# Patient Record
Sex: MALE | Race: WHITE | NOT HISPANIC OR LATINO | Employment: PART TIME | URBAN - METROPOLITAN AREA
[De-identification: names, ages, dates, MRNs, and addresses within clinical notes are randomized per-mention and may not be internally consistent; named-entity substitution may affect disease eponyms.]

---

## 2024-01-04 ENCOUNTER — HOSPITAL ENCOUNTER (EMERGENCY)
Facility: HOSPITAL | Age: 24
Discharge: HOME | End: 2024-01-04
Attending: EMERGENCY MEDICINE
Payer: MEDICAID

## 2024-01-04 VITALS
SYSTOLIC BLOOD PRESSURE: 129 MMHG | RESPIRATION RATE: 18 BRPM | HEIGHT: 74 IN | TEMPERATURE: 97.9 F | BODY MASS INDEX: 20.92 KG/M2 | HEART RATE: 76 BPM | DIASTOLIC BLOOD PRESSURE: 84 MMHG | OXYGEN SATURATION: 97 % | WEIGHT: 163 LBS

## 2024-01-04 DIAGNOSIS — G89.29 CHRONIC DENTAL PAIN: ICD-10-CM

## 2024-01-04 DIAGNOSIS — K02.9 DENTAL CARIES: Primary | ICD-10-CM

## 2024-01-04 DIAGNOSIS — K08.9 CHRONIC DENTAL PAIN: ICD-10-CM

## 2024-01-04 PROCEDURE — 25010000002 KETOROLAC TROMETHAMINE PER 15 MG: Performed by: EMERGENCY MEDICINE

## 2024-01-04 PROCEDURE — 99284 EMERGENCY DEPT VISIT MOD MDM: CPT | Performed by: EMERGENCY MEDICINE

## 2024-01-04 PROCEDURE — 96372 THER/PROPH/DIAG INJ SC/IM: CPT

## 2024-01-04 PROCEDURE — 99282 EMERGENCY DEPT VISIT SF MDM: CPT

## 2024-01-04 RX ORDER — IBUPROFEN 600 MG/1
600 TABLET ORAL EVERY 8 HOURS PRN
Qty: 15 TABLET | Refills: 0 | Status: SHIPPED | OUTPATIENT
Start: 2024-01-04

## 2024-01-04 RX ORDER — PENICILLIN V POTASSIUM 500 MG/1
500 TABLET ORAL 4 TIMES DAILY
Qty: 40 TABLET | Refills: 0 | Status: SHIPPED | OUTPATIENT
Start: 2024-01-04 | End: 2024-01-14

## 2024-01-04 RX ORDER — CHLORHEXIDINE GLUCONATE ORAL RINSE 1.2 MG/ML
15 SOLUTION DENTAL 2 TIMES DAILY
Qty: 473 ML | Refills: 0 | Status: SHIPPED | OUTPATIENT
Start: 2024-01-04

## 2024-01-04 RX ORDER — KETOROLAC TROMETHAMINE 15 MG/ML
15 INJECTION, SOLUTION INTRAMUSCULAR; INTRAVENOUS ONCE
Status: COMPLETED | OUTPATIENT
Start: 2024-01-04 | End: 2024-01-04

## 2024-01-04 RX ADMIN — KETOROLAC TROMETHAMINE 15 MG: 15 INJECTION, SOLUTION INTRAMUSCULAR; INTRAVENOUS at 02:47
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
## 2025-06-14 ENCOUNTER — HOSPITAL ENCOUNTER (OUTPATIENT)
Facility: HOSPITAL | Age: 25
Discharge: HOME | End: 2025-06-14
Attending: EMERGENCY MEDICINE | Admitting: EMERGENCY MEDICINE
Payer: MEDICAID

## 2025-06-14 VITALS
SYSTOLIC BLOOD PRESSURE: 131 MMHG | RESPIRATION RATE: 18 BRPM | HEIGHT: 74 IN | BODY MASS INDEX: 20.07 KG/M2 | WEIGHT: 156.4 LBS | DIASTOLIC BLOOD PRESSURE: 94 MMHG | HEART RATE: 75 BPM | TEMPERATURE: 98 F | OXYGEN SATURATION: 98 %

## 2025-06-14 DIAGNOSIS — R59.0 POSTAURICULAR ADENOPATHY: ICD-10-CM

## 2025-06-14 DIAGNOSIS — K12.2 CELLULITIS OF BUCCAL SPACE OF MOUTH: Primary | ICD-10-CM

## 2025-06-14 PROCEDURE — 99213 OFFICE O/P EST LOW 20 MIN: CPT | Performed by: EMERGENCY MEDICINE

## 2025-06-14 PROCEDURE — G0463 HOSPITAL OUTPT CLINIC VISIT: HCPCS | Performed by: EMERGENCY MEDICINE

## 2025-06-14 RX ORDER — CEFUROXIME AXETIL 500 MG/1
500 TABLET ORAL 2 TIMES DAILY
Qty: 10 TABLET | Refills: 0 | Status: SHIPPED | OUTPATIENT
Start: 2025-06-15 | End: 2025-06-25

## 2025-06-14 RX ORDER — CEPHALEXIN 500 MG/1
500 CAPSULE ORAL ONCE
Status: COMPLETED | OUTPATIENT
Start: 2025-06-14 | End: 2025-06-14

## 2025-06-14 RX ADMIN — CEPHALEXIN 500 MG: 500 CAPSULE ORAL at 21:30

## 2025-06-15 NOTE — FSED PROVIDER NOTE
Subjective   History of Present Illness  Patient presents emergency room complaints of right mid facial swelling since yesterday.  He also reports swelling behind his left ear for 2 months.  He denies pain on either his right face or behind his left ear.  Patient denies dental pain, oral pain, earache, sore throat, neck pain, fever or chills.      Review of Systems   Constitutional:  Negative for chills and fever.   HENT:  Positive for facial swelling. Negative for congestion, ear discharge, ear pain, rhinorrhea, sore throat and trouble swallowing.    Eyes: Negative.    Respiratory:  Negative for cough and shortness of breath.    Cardiovascular:  Negative for chest pain and palpitations.   Musculoskeletal:  Negative for neck pain.   Skin:  Negative for rash.   Neurological:  Negative for light-headedness and headaches.   All other systems reviewed and are negative.      History reviewed. No pertinent past medical history.    No Known Allergies    History reviewed. No pertinent surgical history.    History reviewed. No pertinent family history.    Social History     Socioeconomic History    Marital status: Single   Substance and Sexual Activity    Drug use: Yes     Types: Marijuana           Objective   Physical Exam  Vitals reviewed.   HENT:      Head: Normocephalic.      Comments: Right maxillary facial soft tissue area with mild soft tissue swelling, without palpable tenderness, erythema, palpable tenderness or increased warmth.     Right Ear: Tympanic membrane, ear canal and external ear normal.      Left Ear: Tympanic membrane, ear canal and external ear normal.      Ears:      Comments: Left retroauricular area, 1 cm round soft tissue swelling, without erythema, palpable tenderness or increased warmth.  No dental or perigingival tenderness or swelling.     Nose: Nose normal.      Mouth/Throat:      Mouth: Mucous membranes are moist.      Pharynx: Oropharynx is clear.   Eyes:      Extraocular Movements:  Extraocular movements intact.      Conjunctiva/sclera: Conjunctivae normal.      Pupils: Pupils are equal, round, and reactive to light.   Cardiovascular:      Rate and Rhythm: Normal rate and regular rhythm.   Pulmonary:      Effort: Pulmonary effort is normal. No respiratory distress.      Breath sounds: Normal breath sounds.   Musculoskeletal:      Cervical back: Normal range of motion and neck supple. No tenderness.   Lymphadenopathy:      Cervical: No cervical adenopathy.   Skin:     General: Skin is warm and dry.      Capillary Refill: Capillary refill takes less than 2 seconds.      Findings: No rash.   Neurological:      General: No focal deficit present.      Mental Status: He is alert and oriented to person, place, and time.      Motor: No weakness.         Procedures           ED Course                                           Medical Decision Making  PROGRESS NOTES:    2110 hrs; patient arrives facial cellulitis, will give cephalexin 500 mg by mouth.  He also has left posterior auricular lymphadenopathy.    2142 hrs: Patient stated he feels better, tolerated ED management, I will discharge him home, for outpatient follow-up with primary doctor.      Problems Addressed:  Cellulitis of buccal space of mouth: complicated acute illness or injury  Postauricular adenopathy: complicated acute illness or injury    Risk  Prescription drug management.        Final diagnoses:   Cellulitis of buccal space of mouth   Postauricular adenopathy       ED Disposition  ED Disposition       ED Disposition   Discharge    Condition   Stable    Comment   --               [address] [address]  [phone]  In 3 days  TRANSFER CENTER: Call for Referral for Ear, Nose and Throat Specialist         Medication List        New Prescriptions      cefuroxime 500 MG tablet  Commonly known as: CEFTIN  Take 1 tablet by mouth 2 (Two) Times a Day for 10 days.             Stop      ibuprofen 600 MG tablet  Commonly known as: ROSY BAY               Where to Get Your Medications        These medications were sent to [facility]/pharmacy #[id number] - [place], [place] - [address] [place] [address] - [phone]  - [phone] [address] [place] [address]      Hours: 24-hours Phone: [phone]   cefuroxime 500 MG tablet

## 2025-06-25 ENCOUNTER — HOSPITAL ENCOUNTER (OUTPATIENT)
Facility: HOSPITAL | Age: 25
Discharge: HOME | End: 2025-06-25
Attending: EMERGENCY MEDICINE | Admitting: EMERGENCY MEDICINE
Payer: MEDICAID

## 2025-06-25 VITALS
HEIGHT: 75 IN | RESPIRATION RATE: 18 BRPM | TEMPERATURE: 98.4 F | DIASTOLIC BLOOD PRESSURE: 90 MMHG | HEART RATE: 85 BPM | SYSTOLIC BLOOD PRESSURE: 134 MMHG | BODY MASS INDEX: 19.17 KG/M2 | WEIGHT: 154.2 LBS | OXYGEN SATURATION: 98 %

## 2025-06-25 DIAGNOSIS — K02.9 DENTAL CARIES: Primary | ICD-10-CM

## 2025-06-25 DIAGNOSIS — G89.29 CHRONIC DENTAL PAIN: ICD-10-CM

## 2025-06-25 DIAGNOSIS — K08.9 CHRONIC DENTAL PAIN: ICD-10-CM

## 2025-06-25 PROCEDURE — G0463 HOSPITAL OUTPT CLINIC VISIT: HCPCS | Performed by: NURSE PRACTITIONER

## 2025-06-25 RX ORDER — CHLORHEXIDINE GLUCONATE ORAL RINSE 1.2 MG/ML
15 SOLUTION DENTAL 2 TIMES DAILY
Qty: 150 ML | Refills: 0 | Status: SHIPPED | OUTPATIENT
Start: 2025-06-25 | End: 2025-06-30

## 2025-06-25 RX ORDER — PENICILLIN V POTASSIUM 500 MG/1
500 TABLET, FILM COATED ORAL 3 TIMES DAILY
Qty: 21 TABLET | Refills: 0 | Status: SHIPPED | OUTPATIENT
Start: 2025-06-25 | End: 2025-07-02

## 2025-06-25 RX ADMIN — DIPHENHYDRAMINE HCL ORAL: 25 SOLUTION ORAL at 09:45

## 2025-06-25 NOTE — DISCHARGE INSTRUCTIONS
Follow up with dentist or clinic for further evaluation and treatment.     Medications as prescribed, with being on antibiotics, you should take a probiotic over the counter, or eat yogurt. If you are taking antibiotics frequently you are at risk for C-Diff.     Tylenol/Motrin as needed for pain     [facility] List    [facility]  [address] [facility]  [phone] Based on Income (Monday-Friday)  Appointment ONLY 8am-1pm  $15 minimum   [facility]  [address] [facility]  [phone] Based on Income (Monday-Friday)  Walk in at [facility] at 7:30 am  $12-24 minimum   [facility]  [address] [facility]   [phone] By appointment ONLY  Must call by 8:30 am to obtain an appointment for the next day  $50 minimum   [facility]  [address] [facility]  [phone] Must be homeless to be seen   [facility]  [address] [facility]  [phone] Walk in and appointments  7 days a week 9am-9pm  $83 minimum   [facility]  [address]  [phone] Walk in and appointments  9am-4pm  $50 minimum  Passport and other insurances accepted   [facility]  [address] [facility]  [phone] Walk in and appointments  9am-4pm  $50 minimum  Passport and other insurances accepted   [address] [place] [address] Dental  [address]  [phone] Walk in and appointments  9am-4pm  $50 minimum  Passport and other insurances accepted   [facility]  [phone] Walk in and appointments  9am-4pm  $50 minimum  Passport and other insurances accepted   [address]  [phone] Walk in and appointments  9am-4pm  $50 minimum  Passport and other insurances accepted       List listing is provided only as an informal guide and is not guaranteed to be complete or correct.  Please may your own inquiries and confirm the terms of care prior to setting an appointment.

## 2025-06-25 NOTE — FSED PROVIDER NOTE
Subjective   History of Present Illness  The patient is a 25-year-old male who presents to the ER with right upper and lower dental pain. Patient was just seen her on 06/11/25 for mouth pain/swelling and swollen lymph node, was placed on ceftin, and advised to follow up with ENT/dental clinic which he has not done. Reports  school of dentistry is 6 months out.    History provided by:  Patient   used: No        Review of Systems    No past medical history on file.    No Known Allergies    No past surgical history on file.    No family history on file.    Social History     Socioeconomic History    Marital status: Single   Substance and Sexual Activity    Drug use: Yes     Types: Marijuana           Objective   Physical Exam  Vitals and nursing note reviewed.   Constitutional:       Appearance: Normal appearance.   HENT:      Head: Normocephalic.      Right Ear: Tympanic membrane and ear canal normal.      Left Ear: Tympanic membrane and ear canal normal.      Nose: Nose normal.      Mouth/Throat:      Lips: Pink.      Mouth: Mucous membranes are moist.      Dentition: Dental caries and dental abscesses present. No gum lesions.      Pharynx: Oropharynx is clear.      Comments: Patient with multiple dental caries noted throughout mouth, minimal gingival redness noted. Patient with no trismus, no oral airway edema, no difficulty swallowing, no eccymosis gingival line, no facial swelling, No palpable asbcess noted,     Eyes:      Conjunctiva/sclera: Conjunctivae normal.      Pupils: Pupils are equal, round, and reactive to light.   Musculoskeletal:         General: Normal range of motion.      Cervical back: Normal range of motion and neck supple.   Skin:     General: Skin is warm and dry.   Neurological:      General: No focal deficit present.      Mental Status: He is alert and oriented to person, place, and time.   Psychiatric:         Mood and Affect: Mood normal.         Behavior: Behavior  normal. Behavior is cooperative.         Procedures           ED Course                                           Medical Decision Making  The patient is a 25-year-old male who presents to the ER with right upper and lower dental pain. Patient was just seen her on 06/11/25 for mouth pain/swelling and swollen lymph node, was placed on ceftin, and advised to follow up with ENT/dental clinic which he has not done. Reports  school of dentistry is 6 months out. Patient with multiple dental caries and broken teeth noted throughout mouth, minimal gingival redness noted. Patient with no trismus, no oral airway edema, no difficulty swallowing, no eccymosis gingival line, no facial swelling, No palpable asbcess noted. Patient presents for dental pain due to suspected dental hope. Patient not immunosuppressed, afebrile and well appearing with patent airway, have low suspicfion for deep space infection or any concern for airway compromise. Based on history, physical, and work up. No avulsion or bleeding socket. No evidence of RPA, PTA, Brent's angina, periapical abscess. Instructed patient to continue to treat pain with ibuprofen/acetaminophen until they see a dentist. Patient discharged home and will follow up with dentist. Discussed return precautions for odontogenic infections and other dental pain emergencies. Will provide dental clinic list, and advised of risk of C diff with being on antibiotic treatment, advised patient to get probiotic to take as well.       Problems Addressed:  Chronic dental pain: complicated acute illness or injury  Dental caries: complicated acute illness or injury    Risk  Prescription drug management.        Final diagnoses:   Dental caries   Chronic dental pain       ED Disposition  ED Disposition       ED Disposition   Discharge    Condition   Stable    Comment   --               PATIENT CONNECTION - [facility] [address]  [phone]  In 1 week  As needed, If symptoms worsen, if  you call this number they will help you find a primary care physician if needed.         Medication List        New Prescriptions      chlorhexidine 0.12 % solution  Commonly known as: PERIDEX  Apply 15 mL to the mouth or throat 2 (Two) Times a Day for 5 days.     penicillin v potassium 500 MG tablet  Commonly known as: VEETID  Take 1 tablet by mouth 3 (Three) Times a Day for 7 days.               Where to Get Your Medications        These medications were sent to [facility]/pharmacy #[id number] - [place], [place] - [address] [place] [address] - [phone]  - [phone] [address] [place] [address]      Hours: 24-hours Phone: [phone]   chlorhexidine 0.12 % solution  penicillin v potassium 500 MG tablet

## 2025-06-25 NOTE — Clinical Note
[facility] [place] [address] [place] [address] [place] [address]  Phone: [phone]    Michael Moreno was seen and treated in our emergency department on 6/25/2025.  He may return to work on 06/26/2025.         Thank you for choosing [facility].    Leslie Patton APRN

## 2025-06-25 NOTE — Clinical Note
[facility] [place] [address] [place] [address] [place] [address]  Phone: [phone]    Michael Moreno was seen and treated in our emergency department on 6/25/2025.  He may return to work on 06/26/2025.         Thank you for choosing [facility].    Leslie Patton APRN